# Patient Record
Sex: MALE | Race: WHITE | Employment: PART TIME | URBAN - METROPOLITAN AREA
[De-identification: names, ages, dates, MRNs, and addresses within clinical notes are randomized per-mention and may not be internally consistent; named-entity substitution may affect disease eponyms.]

---

## 2017-01-02 ENCOUNTER — PATIENT MESSAGE (OUTPATIENT)
Dept: FAMILY MEDICINE CLINIC | Facility: CLINIC | Age: 22
End: 2017-01-02

## 2017-01-03 NOTE — TELEPHONE ENCOUNTER
PT needs refill of Abilify. Althia Counter has not prescribed this before. PT stated she could refill once medication was prescribed. He can not remember dosage, but had it filled for the first time at the [facility] on file.

## 2017-01-04 RX ORDER — ALPRAZOLAM 1 MG/1
TABLET ORAL
Qty: 30 TABLET | Refills: 0 | OUTPATIENT
Start: 2017-01-04

## 2017-01-04 RX ORDER — ARIPIPRAZOLE 5 MG/1
5 TABLET ORAL DAILY
Qty: 30 TABLET | Refills: 0 | OUTPATIENT
Start: 2017-01-04 | End: 2017-02-03

## 2017-01-04 NOTE — TELEPHONE ENCOUNTER
See my note on refill request for Abilify. Patient needs to contact the prescriber. Please have Jatinder help patient contact them if patient needs assistance.

## 2017-01-04 NOTE — TELEPHONE ENCOUNTER
We had discussed that when the psychiatrist determined that referral back to our office was appropriate (and if he was given medications that we would typically prescribe in primary care), then we could discuss taking over prescribing the medication.   Gerson h

## 2017-01-26 ENCOUNTER — TELEPHONE (OUTPATIENT)
Dept: FAMILY MEDICINE CLINIC | Facility: CLINIC | Age: 22
End: 2017-01-26

## 2017-01-26 NOTE — TELEPHONE ENCOUNTER
Patient will be transferring care here for medications per psychiatry note. Please call to schedule follow up in 3 months.

## 2017-02-22 ENCOUNTER — TELEPHONE (OUTPATIENT)
Dept: FAMILY MEDICINE CLINIC | Facility: CLINIC | Age: 22
End: 2017-02-22

## 2017-02-22 RX ORDER — ALPRAZOLAM 1 MG/1
1 TABLET ORAL DAILY PRN
Qty: 30 TABLET | Refills: 0 | COMMUNITY
Start: 2017-02-22 | End: 2017-04-03

## 2017-02-22 NOTE — TELEPHONE ENCOUNTER
S/w pt. He confirms dose is correct on file and pharmacy. He is out of rx. I have called in for him.

## 2017-02-22 NOTE — TELEPHONE ENCOUNTER
Patient sent email to [facility] asking him to help facilitate getting a refill on xanax. He will be transitioning care back to us from the psychiatry team.  Melina Pool to refill #30 - verify dose with patient - looks like was 1mg and verify pharmacy.

## 2017-03-10 RX ORDER — BUPROPION HYDROCHLORIDE 150 MG/1
TABLET ORAL
Qty: 90 TABLET | Refills: 0 | Status: SHIPPED | OUTPATIENT
Start: 2017-03-10 | End: 2017-04-03

## 2017-03-13 RX ORDER — ARIPIPRAZOLE 5 MG/1
TABLET ORAL
Qty: 45 TABLET | Refills: 0 | Status: SHIPPED | OUTPATIENT
Start: 2017-03-13 | End: 2017-04-24

## 2017-03-13 RX ORDER — SERTRALINE HYDROCHLORIDE 100 MG/1
TABLET, FILM COATED ORAL
Qty: 60 TABLET | Refills: 0 | Status: SHIPPED | OUTPATIENT
Start: 2017-03-13 | End: 2017-04-25

## 2017-04-03 RX ORDER — ALPRAZOLAM 1 MG/1
TABLET ORAL
Qty: 30 TABLET | Refills: 0 | Status: SHIPPED | OUTPATIENT
Start: 2017-04-03 | End: 2017-04-27

## 2017-04-03 RX ORDER — BUPROPION HYDROCHLORIDE 150 MG/1
TABLET ORAL
Qty: 90 TABLET | Refills: 0 | Status: SHIPPED | OUTPATIENT
Start: 2017-04-03 | End: 2017-04-27

## 2017-04-04 RX ORDER — ALPRAZOLAM 1 MG/1
TABLET ORAL
Qty: 30 TABLET | Refills: 0 | OUTPATIENT
Start: 2017-04-04

## 2017-04-24 RX ORDER — ARIPIPRAZOLE 5 MG/1
TABLET ORAL
Qty: 45 TABLET | Refills: 0 | Status: SHIPPED | OUTPATIENT
Start: 2017-04-24 | End: 2017-04-27

## 2017-04-25 RX ORDER — SERTRALINE HYDROCHLORIDE 100 MG/1
TABLET, FILM COATED ORAL
Qty: 60 TABLET | Refills: 0 | Status: SHIPPED | OUTPATIENT
Start: 2017-04-25 | End: 2017-04-27

## 2017-04-27 ENCOUNTER — OFFICE VISIT (OUTPATIENT)
Dept: FAMILY MEDICINE CLINIC | Facility: CLINIC | Age: 22
End: 2017-04-27

## 2017-04-27 VITALS
DIASTOLIC BLOOD PRESSURE: 84 MMHG | RESPIRATION RATE: 16 BRPM | SYSTOLIC BLOOD PRESSURE: 124 MMHG | BODY MASS INDEX: 46 KG/M2 | TEMPERATURE: 98 F | WEIGHT: 276 LBS | HEART RATE: 80 BPM

## 2017-04-27 DIAGNOSIS — G47.30 SLEEP DISORDER BREATHING: ICD-10-CM

## 2017-04-27 DIAGNOSIS — R06.83 SNORING: ICD-10-CM

## 2017-04-27 DIAGNOSIS — E66.9 OBESITY, UNSPECIFIED OBESITY SEVERITY, UNSPECIFIED OBESITY TYPE: ICD-10-CM

## 2017-04-27 DIAGNOSIS — F41.9 ANXIETY: ICD-10-CM

## 2017-04-27 DIAGNOSIS — R53.83 OTHER FATIGUE: ICD-10-CM

## 2017-04-27 DIAGNOSIS — F32.A DEPRESSION, UNSPECIFIED DEPRESSION TYPE: Primary | ICD-10-CM

## 2017-04-27 PROCEDURE — 99214 OFFICE O/P EST MOD 30 MIN: CPT | Performed by: FAMILY MEDICINE

## 2017-04-27 RX ORDER — BUPROPION HYDROCHLORIDE 150 MG/1
TABLET ORAL
Qty: 90 TABLET | Refills: 1 | Status: SHIPPED | OUTPATIENT
Start: 2017-04-27 | End: 2018-02-06

## 2017-04-27 RX ORDER — ALPRAZOLAM 1 MG/1
TABLET ORAL
Qty: 20 TABLET | Refills: 1 | Status: SHIPPED | OUTPATIENT
Start: 2017-04-27 | End: 2017-07-06

## 2017-04-27 RX ORDER — ARIPIPRAZOLE 10 MG/1
10 TABLET ORAL DAILY
Qty: 30 TABLET | Refills: 1 | Status: SHIPPED | OUTPATIENT
Start: 2017-04-27 | End: 2017-07-11

## 2017-04-27 RX ORDER — SERTRALINE HYDROCHLORIDE 100 MG/1
TABLET, FILM COATED ORAL
Qty: 60 TABLET | Refills: 1 | Status: SHIPPED | OUTPATIENT
Start: 2017-04-27 | End: 2017-08-03

## 2017-04-27 NOTE — PROGRESS NOTES
Reuben Carrillo is a 19year old male. CHIEF COMPLAINT   Follow-up on depression and anxiety  HPI:   Was seeing Evgeny Correa with psychiatry. Last spoke with her in February. Depression worse over the last 2 months.    Seeing Dr. Ron Batista regular of breath with exertion  CARDIOVASCULAR: denies chest pain on exertion  GI: denies abdominal pain and denies heartburn  NEURO: denies headaches    EXAM:   /84 mmHg  Pulse 80  Temp(Src) 98.2 °F (36.8 °C)  Resp 16  Wt 276 lb  GENERAL: well developed, w

## 2017-05-18 ENCOUNTER — TELEPHONE (OUTPATIENT)
Dept: FAMILY MEDICINE CLINIC | Facility: CLINIC | Age: 22
End: 2017-05-18

## 2017-07-06 RX ORDER — ALPRAZOLAM 1 MG/1
TABLET ORAL
Qty: 10 TABLET | Refills: 0 | Status: SHIPPED | OUTPATIENT
Start: 2017-07-06 | End: 2017-07-11

## 2017-07-06 NOTE — TELEPHONE ENCOUNTER
Please call patient. At his last visit, I referred him back to psychiatry. Whom is he currently seeing for psychiatry?

## 2017-07-11 ENCOUNTER — LAB ENCOUNTER (OUTPATIENT)
Dept: LAB | Age: 22
End: 2017-07-11
Attending: FAMILY MEDICINE
Payer: COMMERCIAL

## 2017-07-11 ENCOUNTER — OFFICE VISIT (OUTPATIENT)
Dept: FAMILY MEDICINE CLINIC | Facility: CLINIC | Age: 22
End: 2017-07-11

## 2017-07-11 VITALS
HEIGHT: 65 IN | TEMPERATURE: 99 F | WEIGHT: 272 LBS | DIASTOLIC BLOOD PRESSURE: 70 MMHG | SYSTOLIC BLOOD PRESSURE: 114 MMHG | RESPIRATION RATE: 12 BRPM | HEART RATE: 64 BPM | BODY MASS INDEX: 45.32 KG/M2

## 2017-07-11 DIAGNOSIS — F41.9 ANXIETY: ICD-10-CM

## 2017-07-11 DIAGNOSIS — E55.9 VITAMIN D DEFICIENCY: ICD-10-CM

## 2017-07-11 DIAGNOSIS — E53.8 VITAMIN B12 DEFICIENCY: ICD-10-CM

## 2017-07-11 DIAGNOSIS — F32.A DEPRESSION, UNSPECIFIED DEPRESSION TYPE: ICD-10-CM

## 2017-07-11 DIAGNOSIS — R53.83 OTHER FATIGUE: ICD-10-CM

## 2017-07-11 DIAGNOSIS — F32.A DEPRESSION, UNSPECIFIED DEPRESSION TYPE: Primary | ICD-10-CM

## 2017-07-11 LAB
25-HYDROXYVITAMIN D (TOTAL): 15.8 NG/ML (ref 30–100)
ALBUMIN SERPL-MCNC: 4.1 G/DL (ref 3.5–4.8)
ALP LIVER SERPL-CCNC: 77 U/L (ref 45–117)
ALT SERPL-CCNC: 93 U/L (ref 17–63)
AST SERPL-CCNC: 49 U/L (ref 15–41)
BASOPHILS # BLD AUTO: 0.02 X10(3) UL (ref 0–0.1)
BASOPHILS NFR BLD AUTO: 0.3 %
BILIRUB SERPL-MCNC: 0.4 MG/DL (ref 0.1–2)
BUN BLD-MCNC: 11 MG/DL (ref 8–20)
CALCIUM BLD-MCNC: 9 MG/DL (ref 8.3–10.3)
CHLORIDE: 105 MMOL/L (ref 101–111)
CO2: 25 MMOL/L (ref 22–32)
CREAT BLD-MCNC: 1.34 MG/DL (ref 0.7–1.3)
EOSINOPHIL # BLD AUTO: 0.17 X10(3) UL (ref 0–0.3)
EOSINOPHIL NFR BLD AUTO: 2.7 %
ERYTHROCYTE [DISTWIDTH] IN BLOOD BY AUTOMATED COUNT: 13.4 % (ref 11.5–16)
FREE T4: 1 NG/DL (ref 0.9–1.8)
GLUCOSE BLD-MCNC: 79 MG/DL (ref 70–99)
HAV AB SERPL IA-ACNC: 193 PG/ML (ref 193–986)
HCT VFR BLD AUTO: 43.6 % (ref 37–53)
HGB BLD-MCNC: 14 G/DL (ref 13–17)
IMMATURE GRANULOCYTE COUNT: 0.02 X10(3) UL (ref 0–1)
IMMATURE GRANULOCYTE RATIO %: 0.3 %
LYMPHOCYTES # BLD AUTO: 2.17 X10(3) UL (ref 0.9–4)
LYMPHOCYTES NFR BLD AUTO: 34.8 %
M PROTEIN MFR SERPL ELPH: 7.9 G/DL (ref 6.1–8.3)
MCH RBC QN AUTO: 28.7 PG (ref 27–33.2)
MCHC RBC AUTO-ENTMCNC: 32.1 G/DL (ref 31–37)
MCV RBC AUTO: 89.5 FL (ref 80–99)
MONOCYTES # BLD AUTO: 0.45 X10(3) UL (ref 0.1–0.6)
MONOCYTES NFR BLD AUTO: 7.2 %
NEUTROPHIL ABS PRELIM: 3.41 X10 (3) UL (ref 1.3–6.7)
NEUTROPHILS # BLD AUTO: 3.41 X10(3) UL (ref 1.3–6.7)
NEUTROPHILS NFR BLD AUTO: 54.7 %
PLATELET # BLD AUTO: 294 10(3)UL (ref 150–450)
POTASSIUM SERPL-SCNC: 3.7 MMOL/L (ref 3.6–5.1)
RBC # BLD AUTO: 4.87 X10(6)UL (ref 4.3–5.7)
RED CELL DISTRIBUTION WIDTH-SD: 44.1 FL (ref 35.1–46.3)
SODIUM SERPL-SCNC: 138 MMOL/L (ref 136–144)
TSI SER-ACNC: 0.76 MIU/ML (ref 0.35–5.5)
WBC # BLD AUTO: 6.2 X10(3) UL (ref 4–13)

## 2017-07-11 PROCEDURE — 82607 VITAMIN B-12: CPT | Performed by: FAMILY MEDICINE

## 2017-07-11 PROCEDURE — 99214 OFFICE O/P EST MOD 30 MIN: CPT | Performed by: FAMILY MEDICINE

## 2017-07-11 PROCEDURE — 36415 COLL VENOUS BLD VENIPUNCTURE: CPT | Performed by: FAMILY MEDICINE

## 2017-07-11 PROCEDURE — 82306 VITAMIN D 25 HYDROXY: CPT | Performed by: FAMILY MEDICINE

## 2017-07-11 PROCEDURE — 84439 ASSAY OF FREE THYROXINE: CPT | Performed by: FAMILY MEDICINE

## 2017-07-11 PROCEDURE — 80050 GENERAL HEALTH PANEL: CPT | Performed by: FAMILY MEDICINE

## 2017-07-11 RX ORDER — ALPRAZOLAM 1 MG/1
TABLET ORAL
Qty: 20 TABLET | Refills: 1 | Status: SHIPPED | OUTPATIENT
Start: 2017-07-11 | End: 2017-09-01

## 2017-07-11 RX ORDER — ARIPIPRAZOLE 15 MG/1
15 TABLET ORAL DAILY
Qty: 30 TABLET | Refills: 1 | Status: SHIPPED | OUTPATIENT
Start: 2017-07-11 | End: 2017-09-01

## 2017-07-11 RX ORDER — ARIPIPRAZOLE 10 MG/1
TABLET ORAL
Qty: 30 TABLET | Refills: 0 | OUTPATIENT
Start: 2017-07-11

## 2017-07-11 NOTE — PROGRESS NOTES
Sunny Scruggs is a 19year old male. CHIEF COMPLAINT   Follow up on depression  HPI:   Feels that anxiety is controlled. Feels that motivation is not as well controlled. Intrusive thoughts are controlled. Takes alprazolam 2-3 x per week.  Sleeping m apparent distress  PSYCH:  Normal affect. Normal mood.       ASSESSMENT AND PLAN:   Depression, unspecified depression type  (primary encounter diagnosis)  Anxiety  Other fatigue  Vitamin b12 deficiency  Vitamin d deficiency      Orders Placed This Encounter

## 2017-07-12 DIAGNOSIS — E55.9 VITAMIN D DEFICIENCY: ICD-10-CM

## 2017-07-12 DIAGNOSIS — R79.89 ELEVATED LIVER FUNCTION TESTS: Primary | ICD-10-CM

## 2017-07-12 RX ORDER — ERGOCALCIFEROL 1.25 MG/1
CAPSULE ORAL
Qty: 8 CAPSULE | Refills: 0 | Status: SHIPPED | OUTPATIENT
Start: 2017-07-12 | End: 2017-09-01

## 2017-07-13 RX ORDER — ARIPIPRAZOLE 10 MG/1
TABLET ORAL
Qty: 30 TABLET | Refills: 0 | OUTPATIENT
Start: 2017-07-13

## 2017-08-03 RX ORDER — SERTRALINE HYDROCHLORIDE 100 MG/1
TABLET, FILM COATED ORAL
Qty: 60 TABLET | Refills: 0 | Status: SHIPPED | OUTPATIENT
Start: 2017-08-03 | End: 2017-09-01

## 2017-08-03 RX ORDER — BUPROPION HYDROCHLORIDE 150 MG/1
TABLET ORAL
Qty: 90 TABLET | Refills: 0 | Status: SHIPPED | OUTPATIENT
Start: 2017-08-03 | End: 2017-09-01

## 2017-09-01 RX ORDER — ERGOCALCIFEROL 1.25 MG/1
CAPSULE ORAL
Qty: 8 CAPSULE | Refills: 0 | Status: SHIPPED | OUTPATIENT
Start: 2017-09-01

## 2017-09-01 RX ORDER — ALPRAZOLAM 1 MG/1
TABLET ORAL
Qty: 20 TABLET | Refills: 0 | Status: SHIPPED | OUTPATIENT
Start: 2017-09-01 | End: 2017-09-11

## 2017-09-01 RX ORDER — ARIPIPRAZOLE 15 MG/1
TABLET ORAL
Qty: 30 TABLET | Refills: 0 | Status: SHIPPED | OUTPATIENT
Start: 2017-09-01 | End: 2017-09-28

## 2017-09-01 RX ORDER — BUPROPION HYDROCHLORIDE 150 MG/1
TABLET ORAL
Qty: 90 TABLET | Refills: 0 | Status: SHIPPED | OUTPATIENT
Start: 2017-09-01 | End: 2017-09-11

## 2017-09-01 RX ORDER — SERTRALINE HYDROCHLORIDE 100 MG/1
TABLET, FILM COATED ORAL
Qty: 60 TABLET | Refills: 0 | Status: SHIPPED | OUTPATIENT
Start: 2017-09-01 | End: 2017-09-28

## 2017-09-11 ENCOUNTER — OFFICE VISIT (OUTPATIENT)
Dept: FAMILY MEDICINE CLINIC | Facility: CLINIC | Age: 22
End: 2017-09-11

## 2017-09-11 VITALS
DIASTOLIC BLOOD PRESSURE: 80 MMHG | WEIGHT: 275 LBS | HEIGHT: 65 IN | HEART RATE: 76 BPM | RESPIRATION RATE: 12 BRPM | TEMPERATURE: 99 F | SYSTOLIC BLOOD PRESSURE: 114 MMHG | BODY MASS INDEX: 45.82 KG/M2

## 2017-09-11 DIAGNOSIS — F32.A DEPRESSION, UNSPECIFIED DEPRESSION TYPE: Primary | ICD-10-CM

## 2017-09-11 DIAGNOSIS — F41.9 ANXIETY: ICD-10-CM

## 2017-09-11 DIAGNOSIS — R79.9 ABNORMAL BLOOD CHEMISTRY: ICD-10-CM

## 2017-09-11 DIAGNOSIS — E78.5 HYPERLIPIDEMIA, UNSPECIFIED HYPERLIPIDEMIA TYPE: ICD-10-CM

## 2017-09-11 PROCEDURE — 99213 OFFICE O/P EST LOW 20 MIN: CPT | Performed by: FAMILY MEDICINE

## 2017-09-11 RX ORDER — ALPRAZOLAM 1 MG/1
TABLET ORAL
Qty: 30 TABLET | Refills: 0 | Status: SHIPPED | OUTPATIENT
Start: 2017-09-11 | End: 2018-02-06

## 2017-09-11 NOTE — PATIENT INSTRUCTIONS
See Dr. Pam Johnston for evaluation. Phone number:  [phone]. [address]. [place]. Do labs in the next 4 weeks.

## 2017-09-11 NOTE — PROGRESS NOTES
Farida Campa is a 19year old male. CHIEF COMPLAINT   Follow up on depression  HPI:   Vitamin D seems to be helping with fatigue. Sleeping well but sleeps 9-10 hours sleep every night. Never did sleep study. Doesn't need naps now.   Taking abilify, apparent distress  PSYCH:  Normal affect.  Normal mood    ASSESSMENT AND PLAN:   Hyperlipidemia, unspecified hyperlipidemia type  Abnormal blood chemistry  Depression, unspecified depression type  (primary encounter diagnosis)  Anxiety      Orders Placed Th

## 2017-10-19 RX ORDER — BUPROPION HYDROCHLORIDE 150 MG/1
TABLET ORAL
Qty: 21 TABLET | Refills: 0 | Status: SHIPPED | OUTPATIENT
Start: 2017-10-19 | End: 2018-01-08

## 2017-10-19 RX ORDER — ARIPIPRAZOLE 15 MG/1
TABLET ORAL
Qty: 7 TABLET | Refills: 0 | Status: SHIPPED | OUTPATIENT
Start: 2017-10-19 | End: 2018-01-08

## 2017-10-19 RX ORDER — SERTRALINE HYDROCHLORIDE 100 MG/1
TABLET, FILM COATED ORAL
Qty: 14 TABLET | Refills: 0 | Status: SHIPPED | OUTPATIENT
Start: 2017-10-19 | End: 2018-01-08

## 2017-10-19 NOTE — TELEPHONE ENCOUNTER
I sent 7 days of medication so that he isn't out of medication but hopefully will trigger him to call us.

## 2017-11-10 RX ORDER — ARIPIPRAZOLE 15 MG/1
TABLET ORAL
Qty: 30 TABLET | Refills: 0 | OUTPATIENT
Start: 2017-11-10

## 2017-11-10 RX ORDER — BUPROPION HYDROCHLORIDE 150 MG/1
TABLET ORAL
Qty: 90 TABLET | Refills: 0 | OUTPATIENT
Start: 2017-11-10

## 2017-11-10 RX ORDER — SERTRALINE HYDROCHLORIDE 100 MG/1
TABLET, FILM COATED ORAL
Qty: 60 TABLET | Refills: 0 | OUTPATIENT
Start: 2017-11-10

## 2017-12-27 RX ORDER — SERTRALINE HYDROCHLORIDE 100 MG/1
TABLET, FILM COATED ORAL
Qty: 60 TABLET | Refills: 0 | OUTPATIENT
Start: 2017-12-27

## 2017-12-27 RX ORDER — ARIPIPRAZOLE 15 MG/1
TABLET ORAL
Qty: 30 TABLET | Refills: 0 | OUTPATIENT
Start: 2017-12-27

## 2017-12-27 RX ORDER — BUPROPION HYDROCHLORIDE 150 MG/1
TABLET ORAL
Qty: 90 TABLET | Refills: 0 | OUTPATIENT
Start: 2017-12-27

## 2018-01-10 NOTE — TELEPHONE ENCOUNTER
Not protocol medication. LOV :9/11/17 Med check depression  Last labs done :7/11/17  Next appointment :not yet made   Please see pending medication. Refill if appropriate.    Last refill:    Date:10/19/17  Amount :14 tablets no refill   Medication: sertraline hcl 100 mg     Date: 10/19/17  Amount:7 tablets no refill  Medication: aripiprazole 15mg     Date: 4/27/17  Amount: 90 tablets no refill  Mediation: Bupropion hcl ER xl 150mg

## 2018-01-11 NOTE — TELEPHONE ENCOUNTER
Patient was directed to see psychiatry. Has he seen yet? If not, when is appointment scheduled with psychiatry?

## 2018-01-12 RX ORDER — ARIPIPRAZOLE 15 MG/1
TABLET ORAL
Qty: 30 TABLET | Refills: 0 | OUTPATIENT
Start: 2018-01-12

## 2018-01-12 RX ORDER — BUPROPION HYDROCHLORIDE 150 MG/1
TABLET ORAL
Qty: 90 TABLET | Refills: 0 | OUTPATIENT
Start: 2018-01-12

## 2018-01-12 RX ORDER — ARIPIPRAZOLE 15 MG/1
TABLET ORAL
Qty: 15 TABLET | Refills: 0 | Status: SHIPPED | OUTPATIENT
Start: 2018-01-12

## 2018-01-12 RX ORDER — BUPROPION HYDROCHLORIDE 150 MG/1
TABLET ORAL
Qty: 45 TABLET | Refills: 0 | Status: SHIPPED | OUTPATIENT
Start: 2018-01-12 | End: 2018-02-06

## 2018-01-12 RX ORDER — SERTRALINE HYDROCHLORIDE 100 MG/1
TABLET, FILM COATED ORAL
Qty: 30 TABLET | Refills: 0 | Status: SHIPPED | OUTPATIENT
Start: 2018-01-12 | End: 2018-02-06

## 2018-01-12 RX ORDER — SERTRALINE HYDROCHLORIDE 100 MG/1
TABLET, FILM COATED ORAL
Qty: 60 TABLET | Refills: 0 | OUTPATIENT
Start: 2018-01-12

## 2018-01-12 NOTE — TELEPHONE ENCOUNTER
He needs to see me in the next 2 weeks - 30 minute visit if he hasn't yet set up anything with psychiatry. I have refilled meds for 2 weeks.

## 2018-01-12 NOTE — TELEPHONE ENCOUNTER
Please call pt to schedule a medication follow up appointment with S[facility]. Must be within the next two weeks. Thanks.

## 2018-01-12 NOTE — TELEPHONE ENCOUNTER
Spoke with pt he is in the process of finding a psychiatrist. Pt stated due to change in insurance it has taken longer than expected. That is why pt called in for medication refill. Please advise.

## 2018-01-29 RX ORDER — ALPRAZOLAM 1 MG/1
TABLET ORAL
Qty: 30 TABLET | Refills: 0 | Status: CANCELLED | OUTPATIENT
Start: 2018-01-29

## 2018-02-05 RX ORDER — ALPRAZOLAM 1 MG/1
TABLET ORAL
Qty: 30 TABLET | Refills: 0 | Status: CANCELLED | OUTPATIENT
Start: 2018-02-05

## 2018-02-06 ENCOUNTER — OFFICE VISIT (OUTPATIENT)
Dept: FAMILY MEDICINE CLINIC | Facility: CLINIC | Age: 23
End: 2018-02-06

## 2018-02-06 VITALS
TEMPERATURE: 99 F | DIASTOLIC BLOOD PRESSURE: 76 MMHG | HEIGHT: 65 IN | HEART RATE: 68 BPM | RESPIRATION RATE: 12 BRPM | WEIGHT: 257 LBS | SYSTOLIC BLOOD PRESSURE: 136 MMHG | BODY MASS INDEX: 42.82 KG/M2

## 2018-02-06 DIAGNOSIS — F41.9 ANXIETY: ICD-10-CM

## 2018-02-06 DIAGNOSIS — F32.A DEPRESSION, UNSPECIFIED DEPRESSION TYPE: Primary | ICD-10-CM

## 2018-02-06 PROCEDURE — 99213 OFFICE O/P EST LOW 20 MIN: CPT | Performed by: FAMILY MEDICINE

## 2018-02-06 RX ORDER — SERTRALINE HYDROCHLORIDE 100 MG/1
TABLET, FILM COATED ORAL
Qty: 180 TABLET | Refills: 0 | Status: SHIPPED | OUTPATIENT
Start: 2018-02-06

## 2018-02-06 RX ORDER — BUPROPION HYDROCHLORIDE 150 MG/1
TABLET ORAL
Qty: 270 TABLET | Refills: 0 | Status: SHIPPED | OUTPATIENT
Start: 2018-02-06

## 2018-02-06 RX ORDER — ALPRAZOLAM 1 MG/1
TABLET ORAL
Qty: 30 TABLET | Refills: 1 | Status: SHIPPED | OUTPATIENT
Start: 2018-02-06

## 2018-02-06 NOTE — PROGRESS NOTES
Rima Bryant is a 23year old male. CHIEF COMPLAINT   Follow up depression/anxiety  HPI:   Has had difficulty scheduling with psychiatry. Names he was given not in insurance or couldn't get in touch with them.   Would like to stop aripiprazole - too affect. Normal mood    ASSESSMENT AND PLAN:   Depression, unspecified depression type  (primary encounter diagnosis)  Anxiety    No orders of the defined types were placed in this encounter.       Meds & Refills for this Visit:  Signed Prescriptions Disp Re

## 2020-02-21 ENCOUNTER — WALK IN (OUTPATIENT)
Dept: URGENT CARE | Age: 25
End: 2020-02-21

## 2020-02-21 VITALS
HEART RATE: 83 BPM | SYSTOLIC BLOOD PRESSURE: 118 MMHG | DIASTOLIC BLOOD PRESSURE: 80 MMHG | BODY MASS INDEX: 44.3 KG/M2 | WEIGHT: 250 LBS | HEIGHT: 63 IN | RESPIRATION RATE: 18 BRPM | OXYGEN SATURATION: 97 % | TEMPERATURE: 98.3 F

## 2020-02-21 DIAGNOSIS — J04.0 ACUTE VIRAL LARYNGITIS: Primary | ICD-10-CM

## 2020-02-21 DIAGNOSIS — J02.9 SORE THROAT: ICD-10-CM

## 2020-02-21 LAB
INTERNAL PROCEDURAL CONTROLS ACCEPTABLE: YES
S PYO AG THROAT QL IA.RAPID: NEGATIVE

## 2020-02-21 PROCEDURE — X0943 AMG SELF PAY VISIT: HCPCS | Performed by: NURSE PRACTITIONER

## 2020-02-21 SDOH — HEALTH STABILITY: MENTAL HEALTH: HOW OFTEN DO YOU HAVE A DRINK CONTAINING ALCOHOL?: NEVER

## 2020-02-21 ASSESSMENT — ENCOUNTER SYMPTOMS
CHILLS: 0
FATIGUE: 0
CHOKING: 0
SORE THROAT: 1
VOMITING: 0
FEVER: 1
DIARRHEA: 0
RHINORRHEA: 1
SINUS PRESSURE: 0
NAUSEA: 0
WHEEZING: 0
EYE REDNESS: 0
WEAKNESS: 0
EYE DISCHARGE: 0
COUGH: 1
HEADACHES: 0
TROUBLE SWALLOWING: 0
VOICE CHANGE: 1
DIZZINESS: 0
SHORTNESS OF BREATH: 0
STRIDOR: 0
SINUS PAIN: 0
CHEST TIGHTNESS: 0

## 2022-10-04 ENCOUNTER — WALK IN (OUTPATIENT)
Dept: URGENT CARE | Age: 27
End: 2022-10-04

## 2022-10-04 VITALS
DIASTOLIC BLOOD PRESSURE: 70 MMHG | SYSTOLIC BLOOD PRESSURE: 130 MMHG | HEART RATE: 82 BPM | WEIGHT: 220 LBS | TEMPERATURE: 97.7 F | HEIGHT: 65 IN | OXYGEN SATURATION: 99 % | BODY MASS INDEX: 36.65 KG/M2 | RESPIRATION RATE: 16 BRPM

## 2022-10-04 DIAGNOSIS — J06.9 VIRAL UPPER RESPIRATORY TRACT INFECTION: Primary | ICD-10-CM

## 2022-10-04 LAB
FLUAV AG UPPER RESP QL IA.RAPID: NORMAL
FLUBV AG UPPER RESP QL IA.RAPID: NORMAL
INTERNAL PROCEDURAL CONTROLS ACCEPTABLE: YES
INTERNAL PROCEDURAL CONTROLS ACCEPTABLE: YES
S PYO AG THROAT QL IA.RAPID: NEGATIVE
SARS-COV+SARS-COV-2 AG RESP QL IA.RAPID: NOT DETECTED
TEST LOT EXPIRATION DATE: NORMAL
TEST LOT EXPIRATION DATE: NORMAL
TEST LOT NUMBER: NORMAL
TEST LOT NUMBER: NORMAL

## 2022-10-04 PROCEDURE — 99213 OFFICE O/P EST LOW 20 MIN: CPT | Performed by: NURSE PRACTITIONER

## 2022-10-04 PROCEDURE — 87426 SARSCOV CORONAVIRUS AG IA: CPT | Performed by: NURSE PRACTITIONER

## 2022-10-04 PROCEDURE — 87880 STREP A ASSAY W/OPTIC: CPT | Performed by: NURSE PRACTITIONER

## 2022-10-04 ASSESSMENT — ENCOUNTER SYMPTOMS
EYE DISCHARGE: 0
WHEEZING: 0
PHOTOPHOBIA: 0
FATIGUE: 0
RHINORRHEA: 0
EYE ITCHING: 0
CHILLS: 0
NAUSEA: 0
ACTIVITY CHANGE: 0
SINUS PAIN: 1
APPETITE CHANGE: 0
SWOLLEN GLANDS: 0
HEADACHES: 0
CONSTIPATION: 0
FACIAL SWELLING: 0
TROUBLE SWALLOWING: 0
COUGH: 0
COLOR CHANGE: 0
EYE PAIN: 0
ABDOMINAL PAIN: 0
CHANGE IN BOWEL HABIT: 0
SHORTNESS OF BREATH: 0
NUMBNESS: 0
CHEST TIGHTNESS: 0
VOMITING: 0
VISUAL CHANGE: 0
DIZZINESS: 0
DIARRHEA: 0
ANOREXIA: 0
EYE REDNESS: 0
DIAPHORESIS: 0
FEVER: 0
VERTIGO: 0
LIGHT-HEADEDNESS: 0
WEAKNESS: 0
SINUS PRESSURE: 1
SORE THROAT: 1

## 2023-03-03 ENCOUNTER — TELEPHONE (OUTPATIENT)
Dept: FAMILY MEDICINE CLINIC | Facility: CLINIC | Age: 28
End: 2023-03-03

## 2023-03-14 ENCOUNTER — PATIENT OUTREACH (OUTPATIENT)
Dept: CASE MANAGEMENT | Age: 28
End: 2023-03-14

## 2023-03-14 NOTE — PROCEDURES
The office order for PCP removal request is Approved and finalized on March 14, 2023.     Thanks,  [facility] [employer]

## 2023-09-07 NOTE — TELEPHONE ENCOUNTER
Last seen x 9 months ago and has an upcoming appt in 3 days with Catrina Mcdaniel for follow up. Alert-The patient is alert, awake and responds to voice. The patient is oriented to time, place, and person. The triage nurse is able to obtain subjective information.

## 2025-04-22 NOTE — TELEPHONE ENCOUNTER
Pt has appt 4/27 @ 2 pm [FreeTextEntry1] : The patient was advised of the diagnosis. The natural history of the pathology was explained in full to the patient in layman's terms. All questions were answered. The risks and benefits of surgical and non-surgical treatment alternatives were explained in full to the patient.  Right elbow aspirated today.  Pt will continue compression sleeve x 1 week. RTO prn.   NSAIDs recommended.  Patient warned of risk of NSAID medication to stomach and GI tract, risk of increase blood pressure, cardiac risk, and risk of fluid retention.  The patient should clear taking medication with internist/PMD if any problem with heart, blood pressure, or GI system exists.

## (undated) NOTE — Clinical Note
5/25/2017    Michael Johnston [id number]    Dear Mr. Bernadette Weinberg,     [facility] office has been trying to contact you to discuss your recent test results or you are due for an appointment with your provider.   It is important that we reac

## (undated) NOTE — LETTER
1/15/2018    Citlaly Sanz [id number]    Dear Mr. Lashawn Bedoya,     [address] office has been trying to contact you to discuss your recent test results or you are due for an appointment with your provider. It is important that we reach you to discuss your healthcare needs. Please contact our office at your earliest convenience. Also,  Did you know that you can receive test result information and reminders securely on line through [address] [place] [address] [address]? To register for [employer], open your Internet browser and go to [email or web address] and click \"sign up now\". Follow the on-screen instructions to complete your registration. Thank you for your prompt attention to this matter. You may reach our office at [phone].      Sincerely,      [employer] and Staff

## (undated) NOTE — MR AVS SNAPSHOT
[facility], [address] [id number]               Thank you for choosing us for your health care visit with Latosha Matute PA-C.   We are glad to serve you and happy to provide you with this or be assured that none are required. You can then schedule your appointment. Failure to obtain required authorization numbers can create reimbursement difficulties for you.     Indications/Symptoms:  Snoring    Parasomnias    Comorbidities: (Check all that Where to Get Your Medications      These medications were sent to [facility] [address] [facility], [address] [facility], [phone], [phone]  [address] [address]    Hours:  24-hours Phone:  [phone] Tips for increasing your physical activity – Adults who are physically active are less likely to develop some chronic diseases than adults who are inactive.      HOW TO GET STARTED: HOW TO STAY MOTIVATED:   Start activities slowly and build up over time Do